# Patient Record
Sex: FEMALE | Race: WHITE | Employment: OTHER | URBAN - METROPOLITAN AREA
[De-identification: names, ages, dates, MRNs, and addresses within clinical notes are randomized per-mention and may not be internally consistent; named-entity substitution may affect disease eponyms.]

---

## 2018-07-20 PROBLEM — J18.9 CAP (COMMUNITY ACQUIRED PNEUMONIA): Status: ACTIVE | Noted: 2018-07-06

## 2018-07-20 PROBLEM — J96.01 ACUTE RESPIRATORY FAILURE WITH HYPOXIA (HCC): Status: ACTIVE | Noted: 2018-07-06

## 2018-07-20 PROBLEM — N17.9 ACUTE RENAL FAILURE SUPERIMPOSED ON CHRONIC KIDNEY DISEASE (HCC): Status: ACTIVE | Noted: 2018-06-23

## 2018-07-20 PROBLEM — A41.9 SEPTICEMIA (HCC): Status: ACTIVE | Noted: 2018-07-06

## 2018-07-20 PROBLEM — K74.60 CIRRHOSIS OF LIVER WITHOUT ASCITES (HCC): Status: ACTIVE | Noted: 2018-06-23

## 2018-07-20 PROBLEM — N39.0 UTI (URINARY TRACT INFECTION): Status: ACTIVE | Noted: 2018-07-06

## 2018-07-20 PROBLEM — N18.9 ACUTE RENAL FAILURE SUPERIMPOSED ON CHRONIC KIDNEY DISEASE (HCC): Status: ACTIVE | Noted: 2018-06-23

## 2018-07-31 ENCOUNTER — OFFICE VISIT (OUTPATIENT)
Dept: INFECTIOUS DISEASES | Age: 68
End: 2018-07-31
Payer: MEDICARE

## 2018-07-31 VITALS
WEIGHT: 149 LBS | TEMPERATURE: 98.5 F | DIASTOLIC BLOOD PRESSURE: 67 MMHG | HEIGHT: 62 IN | HEART RATE: 63 BPM | SYSTOLIC BLOOD PRESSURE: 138 MMHG | BODY MASS INDEX: 27.42 KG/M2

## 2018-07-31 DIAGNOSIS — N39.0 URINARY TRACT INFECTION WITHOUT HEMATURIA, SITE UNSPECIFIED: ICD-10-CM

## 2018-07-31 DIAGNOSIS — A04.72 C. DIFFICILE COLITIS: ICD-10-CM

## 2018-07-31 DIAGNOSIS — R78.81 BACTEREMIA: Primary | ICD-10-CM

## 2018-07-31 PROCEDURE — 99213 OFFICE O/P EST LOW 20 MIN: CPT | Performed by: INTERNAL MEDICINE

## 2018-07-31 RX ORDER — INSULIN GLARGINE 100 [IU]/ML
32 INJECTION, SOLUTION SUBCUTANEOUS DAILY
COMMUNITY

## 2018-07-31 RX ORDER — SIMVASTATIN 10 MG
10 TABLET ORAL NIGHTLY
COMMUNITY

## 2018-07-31 RX ORDER — BETHANECHOL CHLORIDE 10 MG/1
10 TABLET ORAL 2 TIMES DAILY
COMMUNITY

## 2018-08-19 PROBLEM — N39.0 UTI (URINARY TRACT INFECTION): Status: RESOLVED | Noted: 2018-07-06 | Resolved: 2018-08-19

## 2019-06-14 RX ORDER — SODIUM CHLORIDE 9 MG/ML
INJECTION, SOLUTION INTRAVENOUS CONTINUOUS
Status: CANCELLED | OUTPATIENT
Start: 2019-06-14

## 2019-06-17 ENCOUNTER — HOSPITAL ENCOUNTER (OUTPATIENT)
Dept: INTERVENTIONAL RADIOLOGY/VASCULAR | Age: 69
Discharge: HOME | End: 2019-06-19
Payer: MEDICARE

## 2019-06-17 VITALS
HEART RATE: 82 BPM | HEIGHT: 62 IN | WEIGHT: 158 LBS | TEMPERATURE: 96.8 F | BODY MASS INDEX: 29.08 KG/M2 | DIASTOLIC BLOOD PRESSURE: 78 MMHG | SYSTOLIC BLOOD PRESSURE: 156 MMHG | RESPIRATION RATE: 18 BRPM | OXYGEN SATURATION: 98 %

## 2019-06-17 DIAGNOSIS — N18.6 ESRD (END STAGE RENAL DISEASE) (HCC): ICD-10-CM

## 2019-06-17 LAB
-: NORMAL
INR BLD: 1
PARTIAL THROMBOPLASTIN TIME: 26.1 SEC (ref 23–31)
PLATELET # BLD: 178 K/UL (ref 138–453)
POTASSIUM SERPL-SCNC: 4.5 MMOL/L (ref 3.7–5.3)
PROTHROMBIN TIME: 10.2 SEC (ref 9.7–11.6)
REASON FOR REJECTION: NORMAL
ZZ NTE CLEAN UP: ORDERED TEST: NORMAL
ZZ NTE WITH NAME CLEAN UP: SPECIMEN SOURCE: NORMAL

## 2019-06-17 PROCEDURE — 6360000004 HC RX CONTRAST MEDICATION: Performed by: RADIOLOGY

## 2019-06-17 PROCEDURE — 2580000003 HC RX 258: Performed by: RADIOLOGY

## 2019-06-17 PROCEDURE — 85610 PROTHROMBIN TIME: CPT

## 2019-06-17 PROCEDURE — 7100000010 HC PHASE II RECOVERY - FIRST 15 MIN

## 2019-06-17 PROCEDURE — 85049 AUTOMATED PLATELET COUNT: CPT

## 2019-06-17 PROCEDURE — 99152 MOD SED SAME PHYS/QHP 5/>YRS: CPT | Performed by: RADIOLOGY

## 2019-06-17 PROCEDURE — 85730 THROMBOPLASTIN TIME PARTIAL: CPT

## 2019-06-17 PROCEDURE — 7100000011 HC PHASE II RECOVERY - ADDTL 15 MIN

## 2019-06-17 PROCEDURE — 99153 MOD SED SAME PHYS/QHP EA: CPT | Performed by: RADIOLOGY

## 2019-06-17 PROCEDURE — 36415 COLL VENOUS BLD VENIPUNCTURE: CPT

## 2019-06-17 PROCEDURE — 6360000002 HC RX W HCPCS: Performed by: RADIOLOGY

## 2019-06-17 PROCEDURE — 84132 ASSAY OF SERUM POTASSIUM: CPT

## 2019-06-17 PROCEDURE — 36907 BALO ANGIOP CTR DIALYSIS SEG: CPT | Performed by: RADIOLOGY

## 2019-06-17 PROCEDURE — 36902 INTRO CATH DIALYSIS CIRCUIT: CPT | Performed by: RADIOLOGY

## 2019-06-17 PROCEDURE — C1725 CATH, TRANSLUMIN NON-LASER: HCPCS

## 2019-06-17 RX ORDER — ACETAMINOPHEN 325 MG/1
650 TABLET ORAL EVERY 4 HOURS PRN
Status: DISCONTINUED | OUTPATIENT
Start: 2019-06-17 | End: 2019-06-20 | Stop reason: HOSPADM

## 2019-06-17 RX ORDER — SODIUM CHLORIDE 9 MG/ML
INJECTION, SOLUTION INTRAVENOUS CONTINUOUS
Status: DISCONTINUED | OUTPATIENT
Start: 2019-06-17 | End: 2019-06-17 | Stop reason: SDUPTHER

## 2019-06-17 RX ORDER — FENTANYL CITRATE 50 UG/ML
INJECTION, SOLUTION INTRAMUSCULAR; INTRAVENOUS
Status: COMPLETED | OUTPATIENT
Start: 2019-06-17 | End: 2019-06-17

## 2019-06-17 RX ORDER — SODIUM CHLORIDE 9 MG/ML
INJECTION, SOLUTION INTRAVENOUS CONTINUOUS
Status: DISCONTINUED | OUTPATIENT
Start: 2019-06-17 | End: 2019-06-20 | Stop reason: HOSPADM

## 2019-06-17 RX ORDER — CALCIUM ACETATE 667 MG/1
667 CAPSULE ORAL
COMMUNITY

## 2019-06-17 RX ADMIN — SODIUM CHLORIDE: 9 INJECTION, SOLUTION INTRAVENOUS at 06:50

## 2019-06-17 RX ADMIN — Medication 25 MCG: at 09:15

## 2019-06-17 RX ADMIN — Medication 25 MCG: at 09:00

## 2019-06-17 RX ADMIN — Medication 25 MCG: at 09:09

## 2019-06-17 RX ADMIN — IOPAMIDOL 100 ML: 612 INJECTION, SOLUTION INTRATHECAL at 09:30

## 2019-06-17 ASSESSMENT — PAIN SCALES - GENERAL: PAINLEVEL_OUTOF10: 0

## 2019-06-17 ASSESSMENT — PAIN - FUNCTIONAL ASSESSMENT: PAIN_FUNCTIONAL_ASSESSMENT: 0-10

## 2019-06-17 NOTE — H&P
History and Physical Service   [place] [id number]    HISTORY AND PHYSICAL EXAMINATION            Date of Evaluation: 6/17/2019  Patient name:  Bynum Najjar  MRN:   [id number]  YOB: 1950  PCP:    Olya Mosley MD    History Obtained From:     Patient, medical records    History of Present Illness: This is Bynum Najjar a 78 y.o. female who presents today for a fistulogram in . She has a chronic history of renal failure with dysfunction of the fistula. She denies any chest pain, cough, fever or chills, arm pain at the fistula site. She also has a history of cirrhosis and acute respiratory failure in the past.      Past Medical History:     Past Medical History:   Diagnosis Date    Acute renal failure superimposed on chronic kidney disease ([address] 6/23/2018    Acute respiratory failure with hypoxia ([address] 7/6/2018    CAP (community acquired pneumonia) 7/6/2018    Cirrhosis of liver without ascites ([address] 6/23/2018    Overview:  Added automatically from request for surgery [id number]    Dialysis patient [facility])     Mon wed Fri    Hyperlipidemia     IDDM (insulin dependent diabetes mellitus) ([address] 7/6/2018    UTI (urinary tract infection) 7/6/2018        Past Surgical History:     Past Surgical History:   Procedure Laterality Date    AV FISTULA CREATION  02/2019    CHOLECYSTECTOMY      TONSILLECTOMY          Medications Prior to Admission:     Prior to Admission medications    Medication Sig Start Date End Date Taking?  Authorizing Provider   Calcium Acetate, Phos Binder, (PHOSLO) 667 MG CAPS Take 667 mg by mouth 3 times daily (with meals)   Yes Historical Provider, MD   simvastatin (ZOCOR) 10 MG tablet Take 10 mg by mouth nightly   Yes Historical Provider, MD   insulin glargine (LANTUS) 100 UNIT/ML injection vial Inject 32 Units into the skin daily    Yes Historical Provider, MD   Tamsulosin HCl (FLOMAX PO) Take 0.4 mg by mouth daily    Yes Historical Provider, MD bethanechol (URECHOLINE) 10 MG tablet Take 10 mg by mouth 2 times daily   Yes Historical Provider, MD   Amylase-Lipase-Protease (CREON 10 PO) Take by mouth 3 times daily (before meals)    Yes Historical Provider, MD   aspirin 81 MG tablet Take 81 mg by mouth daily    Historical Provider, MD        Allergies:     Patient has no known allergies. Social History:     Tobacco:    reports that she has quit smoking. She has never used smokeless tobacco.  Alcohol:      reports that she does not drink alcohol. Drug Use:  reports that she does not use drugs. Family History:     History reviewed. No pertinent family history. Review of Systems:     Positive and Negative as described in HPI. CONSTITUTIONAL:  negative for fevers, chills, sweats, fatigue, weight loss  HEENT:  negative for vision, hearing changes, runny nose, throat pain  RESPIRATORY:  negative for shortness of breath, cough, congestion, wheezing. CARDIOVASCULAR:  negative for chest pain, palpitations.   GASTROINTESTINAL:  negative for nausea, vomiting, diarrhea, constipation, change in bowel habits, abdominal pain   GENITOURINARY:  negative for difficulty of urination, burning with urination, frequency   INTEGUMENT:  negative for rash, skin lesions, easy bruising   HEMATOLOGIC/LYMPHATIC:  negative for swelling/edema   ALLERGIC/IMMUNOLOGIC:  negative for urticaria , itching  ENDOCRINE:  negative increase in drinking, increase in urination, hot or cold intolerance  MUSCULOSKELETAL:  negative joint pains, muscle aches, swelling of joints  NEUROLOGICAL:  negative for headaches, dizziness, lightheadedness, numbness, pain, tingling extremities  BEHAVIOR/PSYCH:  negative for depression, anxiety    Physical Exam:   BP (!) 166/67   Pulse 82   Temp 97.4 °F (36.3 °C) (Oral)   Resp 16   Ht 5' 2\" (1.575 m)   Wt 158 lb (71.7 kg)   SpO2 96%   BMI 28.90 kg/m²       General Appearance:  alert, well appearing, and in no acute distress  Mental status: oriented to person, place, and time with normal affect  Head:  normocephalic, atraumatic. Eye: no icterus, redness, pupils equal and reactive, extraocular eye movements intact, conjunctiva clear  Ear: normal external ear, no discharge, hearing intact  Nose:  no drainage noted  Mouth: mucous membranes moist  Neck: supple, no carotid bruits, thyroid not palpable  Lungs: Bilateral equal air entry, clear to ausculation, no wheezing, rales or rhonchi, normal effort  Cardiovascular: normal rate, regular rhythm, no murmur, gallop, rub.   Abdomen: Soft, nontender, nondistended, normal bowel sounds, no hepatomegaly or splenomegaly  Neurologic: There are no new focal motor or sensory deficits, normal muscle tone and bulk, no abnormal sensation, normal speech, cranial nerves II through XII grossly intact  Skin: No gross lesions, rashes, bruising or bleeding on exposed skin area  Extremities:  peripheral pulses palpable, no pedal edema or calf pain with palpation  Psych: normal affect         Diagnosis:      Renal Failure      GIORGIO Dolan CNP  6/17/2019  7:03 AM

## 2019-06-17 NOTE — PRE SEDATION
Sedation Pre-Procedure Note    Patient Name: Margaux Zabala   YOB: 1950  Room/Bed: Room/bed info not found  Medical Record Number: [id number]  Date: 6/17/2019   Time: 8:38 AM       Indication:  Malfunctioning fistula    Consent: I have discussed with the patient and/or the patient representative the indication, alternatives, and the possible risks and/or complications of the planned procedure and the anesthesia methods. The patient and/or patient representative appear to understand and agree to proceed. Vital Signs:   Vitals:    06/17/19 0638   BP: (!) 166/67   Pulse: 82   Resp: 16   Temp: 97.4 °F (36.3 °C)   SpO2: 96%       Past Medical History:   has a past medical history of Acute renal failure superimposed on chronic kidney disease ([facility] [address] Acute respiratory failure with hypoxia ([facility] [address] CAP (community acquired pneumonia), Cirrhosis of liver without ascites ([facility] [address] Dialysis patient ([facility] [address] Hyperlipidemia, IDDM (insulin dependent diabetes mellitus) ([facility] [address] and UTI (urinary tract infection). Past Surgical History:   has a past surgical history that includes AV fistula creation (02/2019); Cholecystectomy; and Tonsillectomy. Medications:   Scheduled Meds:   Continuous Infusions:    sodium chloride 20 mL/hr at 06/17/19 0650     PRN Meds:   Home Meds:   Prior to Admission medications    Medication Sig Start Date End Date Taking?  Authorizing Provider   Calcium Acetate, Phos Binder, (PHOSLO) 667 MG CAPS Take 667 mg by mouth 3 times daily (with meals)   Yes Historical Provider, MD   simvastatin (ZOCOR) 10 MG tablet Take 10 mg by mouth nightly   Yes Historical Provider, MD   insulin glargine (LANTUS) 100 UNIT/ML injection vial Inject 32 Units into the skin daily    Yes Historical Provider, MD   Tamsulosin HCl (FLOMAX PO) Take 0.4 mg by mouth daily    Yes Historical Provider, MD   bethanechol (URECHOLINE) 10 MG tablet Take 10 mg by mouth 2 times daily   Yes Historical Provider, MD   Amylase-Lipase-Protease (CREON 10 PO) Take by mouth 3 times daily (before meals)    Yes Historical Provider, MD   aspirin 81 MG tablet Take 81 mg by mouth daily    Historical Provider, MD     Coumadin Use Last 7 Days:  no  Antiplatelet drug therapy use last 7 days: no  Other anticoagulant use last 7 days: no  Additional Medication Information:  See [facility]      Pre-Sedation Documentation and Exam:   I have reviewed the patient's history and review of systems.     Mallampati Airway Assessment:  Mallampati Class II - (soft palate, fauces & uvula are visible)    Prior History of Anesthesia Complications:   none    ASA Classification:  Class 3 - A patient with severe systemic disease that limits activity but is not incapacitating    Sedation/ Anesthesia Plan:   intravenous sedation    Medications Planned:   midazolam (Versed) intravenously and fentanyl intravenously    Patient is an appropriate candidate for plan of sedation: yes    Electronically signed by Richard Quiroz MD on 6/17/2019 at 8:38 AM

## 2019-06-17 NOTE — POST SEDATION
Sedation Post Procedure Note    Patient Name: Aneudy Will   YOB: 1950  Room/Bed: Room/bed info not found  Medical Record Number: [id number]  Date: 6/17/2019   Time: 9:31 AM         Physicians/Assistants: Darshana Melton MD    Procedure Performed:  Fistulagram and venoplasty    Post-Sedation Vital Signs:  Vitals:    06/17/19 0923   BP: (!) 137/101   Pulse: 78   Resp: 16   Temp:    SpO2: 100%      Vital signs were reviewed and were stable after the procedure (see flow sheet for vitals)            Complications: none    Electronically signed by Darshana Melton MD on 6/17/2019 at 9:31 AM

## 2019-06-17 NOTE — BRIEF OP NOTE
Brief Postoperative Note    Justine Babb  YOB: 1950  [id number]    Pre-operative Diagnosis: Malfunctioning right arm fistula    Post-operative Diagnosis: Same    Procedure: Fistulagram and venoplasty    Anesthesia: Local    Surgeons/Assistants: Noel    Estimated Blood Loss: less than 50     Complications: None    Specimens: Was Not Obtained    Electronically signed by Luis Fernando Robb MD on 6/17/2019 at 9:31 AM